# Patient Record
Sex: FEMALE | Race: WHITE | NOT HISPANIC OR LATINO
[De-identification: names, ages, dates, MRNs, and addresses within clinical notes are randomized per-mention and may not be internally consistent; named-entity substitution may affect disease eponyms.]

---

## 2017-09-07 ENCOUNTER — APPOINTMENT (OUTPATIENT)
Dept: ANTEPARTUM | Facility: CLINIC | Age: 32
End: 2017-09-07
Payer: COMMERCIAL

## 2017-09-07 ENCOUNTER — ASOB RESULT (OUTPATIENT)
Age: 32
End: 2017-09-07

## 2017-09-07 PROCEDURE — 76811 OB US DETAILED SNGL FETUS: CPT

## 2017-11-22 ENCOUNTER — OUTPATIENT (OUTPATIENT)
Dept: OUTPATIENT SERVICES | Facility: HOSPITAL | Age: 32
LOS: 1 days | End: 2017-11-22
Payer: COMMERCIAL

## 2017-11-22 DIAGNOSIS — Z3A.00 WEEKS OF GESTATION OF PREGNANCY NOT SPECIFIED: ICD-10-CM

## 2017-11-22 DIAGNOSIS — O26.899 OTHER SPECIFIED PREGNANCY RELATED CONDITIONS, UNSPECIFIED TRIMESTER: ICD-10-CM

## 2017-11-22 PROCEDURE — G0463: CPT

## 2017-11-22 PROCEDURE — 59025 FETAL NON-STRESS TEST: CPT

## 2017-12-12 ENCOUNTER — TRANSCRIPTION ENCOUNTER (OUTPATIENT)
Age: 32
End: 2017-12-12

## 2018-01-22 ENCOUNTER — INPATIENT (INPATIENT)
Facility: HOSPITAL | Age: 33
LOS: 1 days | Discharge: HOME | End: 2018-01-24
Attending: OBSTETRICS & GYNECOLOGY | Admitting: OBSTETRICS & GYNECOLOGY
Payer: COMMERCIAL

## 2018-01-22 VITALS
RESPIRATION RATE: 18 BRPM | SYSTOLIC BLOOD PRESSURE: 126 MMHG | OXYGEN SATURATION: 100 % | HEART RATE: 71 BPM | TEMPERATURE: 98 F | DIASTOLIC BLOOD PRESSURE: 79 MMHG

## 2018-01-22 DIAGNOSIS — O26.899 OTHER SPECIFIED PREGNANCY RELATED CONDITIONS, UNSPECIFIED TRIMESTER: ICD-10-CM

## 2018-01-22 DIAGNOSIS — Z3A.00 WEEKS OF GESTATION OF PREGNANCY NOT SPECIFIED: ICD-10-CM

## 2018-01-22 DIAGNOSIS — Z34.80 ENCOUNTER FOR SUPERVISION OF OTHER NORMAL PREGNANCY, UNSPECIFIED TRIMESTER: ICD-10-CM

## 2018-01-22 LAB
BASOPHILS # BLD AUTO: 0 K/UL — SIGNIFICANT CHANGE UP (ref 0–0.2)
BASOPHILS NFR BLD AUTO: 0.3 % — SIGNIFICANT CHANGE UP (ref 0–2)
BLD GP AB SCN SERPL QL: NEGATIVE — SIGNIFICANT CHANGE UP
EOSINOPHIL # BLD AUTO: 0.1 K/UL — SIGNIFICANT CHANGE UP (ref 0–0.5)
EOSINOPHIL NFR BLD AUTO: 0.9 % — SIGNIFICANT CHANGE UP (ref 0–6)
HCT VFR BLD CALC: 29.2 % — LOW (ref 34.5–45)
HGB BLD-MCNC: 10.5 G/DL — LOW (ref 11.5–15.5)
LYMPHOCYTES # BLD AUTO: 1.8 K/UL — SIGNIFICANT CHANGE UP (ref 1–3.3)
LYMPHOCYTES # BLD AUTO: 17.4 % — SIGNIFICANT CHANGE UP (ref 13–44)
MCHC RBC-ENTMCNC: 32.8 PG — SIGNIFICANT CHANGE UP (ref 27–34)
MCHC RBC-ENTMCNC: 36 GM/DL — SIGNIFICANT CHANGE UP (ref 32–36)
MCV RBC AUTO: 91 FL — SIGNIFICANT CHANGE UP (ref 80–100)
MONOCYTES # BLD AUTO: 0.5 K/UL — SIGNIFICANT CHANGE UP (ref 0–0.9)
MONOCYTES NFR BLD AUTO: 4.8 % — SIGNIFICANT CHANGE UP (ref 2–14)
NEUTROPHILS # BLD AUTO: 8.1 K/UL — HIGH (ref 1.8–7.4)
NEUTROPHILS NFR BLD AUTO: 76.7 % — SIGNIFICANT CHANGE UP (ref 43–77)
PLATELET # BLD AUTO: 201 K/UL — SIGNIFICANT CHANGE UP (ref 150–400)
RBC # BLD: 3.21 M/UL — LOW (ref 3.8–5.2)
RBC # FLD: 11.7 % — SIGNIFICANT CHANGE UP (ref 10.3–14.5)
RH IG SCN BLD-IMP: POSITIVE — SIGNIFICANT CHANGE UP
WBC # BLD: 10.5 K/UL — SIGNIFICANT CHANGE UP (ref 3.8–10.5)
WBC # FLD AUTO: 10.5 K/UL — SIGNIFICANT CHANGE UP (ref 3.8–10.5)

## 2018-01-22 RX ORDER — DIPHENHYDRAMINE HCL 50 MG
25 CAPSULE ORAL EVERY 6 HOURS
Qty: 0 | Refills: 0 | Status: DISCONTINUED | OUTPATIENT
Start: 2018-01-22 | End: 2018-01-24

## 2018-01-22 RX ORDER — IBUPROFEN 200 MG
600 TABLET ORAL EVERY 6 HOURS
Qty: 0 | Refills: 0 | Status: COMPLETED | OUTPATIENT
Start: 2018-01-22 | End: 2018-12-21

## 2018-01-22 RX ORDER — SODIUM CHLORIDE 9 MG/ML
1000 INJECTION, SOLUTION INTRAVENOUS ONCE
Qty: 0 | Refills: 0 | Status: DISCONTINUED | OUTPATIENT
Start: 2018-01-22 | End: 2018-01-22

## 2018-01-22 RX ORDER — DIBUCAINE 1 %
1 OINTMENT (GRAM) RECTAL EVERY 4 HOURS
Qty: 0 | Refills: 0 | Status: DISCONTINUED | OUTPATIENT
Start: 2018-01-22 | End: 2018-01-24

## 2018-01-22 RX ORDER — SODIUM CHLORIDE 9 MG/ML
3 INJECTION INTRAMUSCULAR; INTRAVENOUS; SUBCUTANEOUS EVERY 8 HOURS
Qty: 0 | Refills: 0 | Status: DISCONTINUED | OUTPATIENT
Start: 2018-01-22 | End: 2018-01-24

## 2018-01-22 RX ORDER — KETOROLAC TROMETHAMINE 30 MG/ML
30 SYRINGE (ML) INJECTION ONCE
Qty: 0 | Refills: 0 | Status: DISCONTINUED | OUTPATIENT
Start: 2018-01-22 | End: 2018-01-22

## 2018-01-22 RX ORDER — OXYCODONE HYDROCHLORIDE 5 MG/1
5 TABLET ORAL
Qty: 0 | Refills: 0 | Status: DISCONTINUED | OUTPATIENT
Start: 2018-01-22 | End: 2018-01-24

## 2018-01-22 RX ORDER — SODIUM CHLORIDE 9 MG/ML
1000 INJECTION, SOLUTION INTRAVENOUS
Qty: 0 | Refills: 0 | Status: DISCONTINUED | OUTPATIENT
Start: 2018-01-22 | End: 2018-01-22

## 2018-01-22 RX ORDER — ACETAMINOPHEN 500 MG
975 TABLET ORAL EVERY 6 HOURS
Qty: 0 | Refills: 0 | Status: COMPLETED | OUTPATIENT
Start: 2018-01-22 | End: 2018-12-21

## 2018-01-22 RX ORDER — OXYTOCIN 10 UNIT/ML
333.33 VIAL (ML) INJECTION
Qty: 20 | Refills: 0 | Status: COMPLETED | OUTPATIENT
Start: 2018-01-22 | End: 2018-01-22

## 2018-01-22 RX ORDER — PRAMOXINE HYDROCHLORIDE 150 MG/15G
1 AEROSOL, FOAM RECTAL EVERY 4 HOURS
Qty: 0 | Refills: 0 | Status: DISCONTINUED | OUTPATIENT
Start: 2018-01-22 | End: 2018-01-24

## 2018-01-22 RX ORDER — AER TRAVELER 0.5 G/1
1 SOLUTION RECTAL; TOPICAL EVERY 4 HOURS
Qty: 0 | Refills: 0 | Status: DISCONTINUED | OUTPATIENT
Start: 2018-01-22 | End: 2018-01-24

## 2018-01-22 RX ORDER — TETANUS TOXOID, REDUCED DIPHTHERIA TOXOID AND ACELLULAR PERTUSSIS VACCINE, ADSORBED 5; 2.5; 8; 8; 2.5 [IU]/.5ML; [IU]/.5ML; UG/.5ML; UG/.5ML; UG/.5ML
0.5 SUSPENSION INTRAMUSCULAR ONCE
Qty: 0 | Refills: 0 | Status: DISCONTINUED | OUTPATIENT
Start: 2018-01-22 | End: 2018-01-24

## 2018-01-22 RX ORDER — OXYTOCIN 10 UNIT/ML
333.33 VIAL (ML) INJECTION
Qty: 20 | Refills: 0 | Status: COMPLETED | OUTPATIENT
Start: 2018-01-22

## 2018-01-22 RX ORDER — SIMETHICONE 80 MG/1
80 TABLET, CHEWABLE ORAL EVERY 6 HOURS
Qty: 0 | Refills: 0 | Status: DISCONTINUED | OUTPATIENT
Start: 2018-01-22 | End: 2018-01-24

## 2018-01-22 RX ORDER — CITRIC ACID/SODIUM CITRATE 300-500 MG
15 SOLUTION, ORAL ORAL EVERY 4 HOURS
Qty: 0 | Refills: 0 | Status: DISCONTINUED | OUTPATIENT
Start: 2018-01-22 | End: 2018-01-22

## 2018-01-22 RX ORDER — OXYTOCIN 10 UNIT/ML
4 VIAL (ML) INJECTION
Qty: 30 | Refills: 0 | Status: DISCONTINUED | OUTPATIENT
Start: 2018-01-22 | End: 2018-01-24

## 2018-01-22 RX ORDER — IBUPROFEN 200 MG
600 TABLET ORAL EVERY 6 HOURS
Qty: 0 | Refills: 0 | Status: DISCONTINUED | OUTPATIENT
Start: 2018-01-22 | End: 2018-01-24

## 2018-01-22 RX ORDER — OXYTOCIN 10 UNIT/ML
41.67 VIAL (ML) INJECTION
Qty: 20 | Refills: 0 | Status: DISCONTINUED | OUTPATIENT
Start: 2018-01-22 | End: 2018-01-24

## 2018-01-22 RX ORDER — OXYCODONE HYDROCHLORIDE 5 MG/1
5 TABLET ORAL EVERY 4 HOURS
Qty: 0 | Refills: 0 | Status: DISCONTINUED | OUTPATIENT
Start: 2018-01-22 | End: 2018-01-24

## 2018-01-22 RX ORDER — INFLUENZA VIRUS VACCINE 15; 15; 15; 15 UG/.5ML; UG/.5ML; UG/.5ML; UG/.5ML
0.5 SUSPENSION INTRAMUSCULAR ONCE
Qty: 0 | Refills: 0 | Status: DISCONTINUED | OUTPATIENT
Start: 2018-01-22 | End: 2018-01-24

## 2018-01-22 RX ORDER — ACETAMINOPHEN 500 MG
975 TABLET ORAL EVERY 6 HOURS
Qty: 0 | Refills: 0 | Status: DISCONTINUED | OUTPATIENT
Start: 2018-01-22 | End: 2018-01-24

## 2018-01-22 RX ORDER — GLYCERIN ADULT
1 SUPPOSITORY, RECTAL RECTAL AT BEDTIME
Qty: 0 | Refills: 0 | Status: DISCONTINUED | OUTPATIENT
Start: 2018-01-22 | End: 2018-01-24

## 2018-01-22 RX ORDER — DOCUSATE SODIUM 100 MG
100 CAPSULE ORAL
Qty: 0 | Refills: 0 | Status: DISCONTINUED | OUTPATIENT
Start: 2018-01-22 | End: 2018-01-24

## 2018-01-22 RX ORDER — HYDROCORTISONE 1 %
1 OINTMENT (GRAM) TOPICAL EVERY 4 HOURS
Qty: 0 | Refills: 0 | Status: DISCONTINUED | OUTPATIENT
Start: 2018-01-22 | End: 2018-01-24

## 2018-01-22 RX ORDER — LANOLIN
1 OINTMENT (GRAM) TOPICAL EVERY 6 HOURS
Qty: 0 | Refills: 0 | Status: DISCONTINUED | OUTPATIENT
Start: 2018-01-22 | End: 2018-01-24

## 2018-01-22 RX ORDER — MAGNESIUM HYDROXIDE 400 MG/1
30 TABLET, CHEWABLE ORAL
Qty: 0 | Refills: 0 | Status: DISCONTINUED | OUTPATIENT
Start: 2018-01-22 | End: 2018-01-24

## 2018-01-22 RX ADMIN — Medication 1000 MILLIUNIT(S)/MIN: at 20:46

## 2018-01-22 RX ADMIN — OXYCODONE HYDROCHLORIDE 5 MILLIGRAM(S): 5 TABLET ORAL at 22:52

## 2018-01-22 RX ADMIN — OXYCODONE HYDROCHLORIDE 5 MILLIGRAM(S): 5 TABLET ORAL at 23:20

## 2018-01-22 RX ADMIN — Medication 4 MILLIUNIT(S)/MIN: at 14:58

## 2018-01-22 RX ADMIN — Medication 30 MILLIGRAM(S): at 16:48

## 2018-01-23 ENCOUNTER — TRANSCRIPTION ENCOUNTER (OUTPATIENT)
Age: 33
End: 2018-01-23

## 2018-01-23 LAB
HCT VFR BLD CALC: 29 % — LOW (ref 34.5–45)
HGB BLD-MCNC: 9.6 G/DL — LOW (ref 11.5–15.5)
T PALLIDUM AB TITR SER: NEGATIVE — SIGNIFICANT CHANGE UP

## 2018-01-23 RX ADMIN — Medication 600 MILLIGRAM(S): at 21:54

## 2018-01-23 RX ADMIN — OXYCODONE HYDROCHLORIDE 5 MILLIGRAM(S): 5 TABLET ORAL at 14:50

## 2018-01-23 RX ADMIN — OXYCODONE HYDROCHLORIDE 5 MILLIGRAM(S): 5 TABLET ORAL at 06:54

## 2018-01-23 RX ADMIN — Medication 600 MILLIGRAM(S): at 14:18

## 2018-01-23 RX ADMIN — Medication 975 MILLIGRAM(S): at 18:34

## 2018-01-23 RX ADMIN — Medication 100 MILLIGRAM(S): at 13:33

## 2018-01-23 RX ADMIN — OXYCODONE HYDROCHLORIDE 5 MILLIGRAM(S): 5 TABLET ORAL at 22:25

## 2018-01-23 RX ADMIN — Medication 100 MILLIGRAM(S): at 06:24

## 2018-01-23 RX ADMIN — OXYCODONE HYDROCHLORIDE 5 MILLIGRAM(S): 5 TABLET ORAL at 14:18

## 2018-01-23 RX ADMIN — OXYCODONE HYDROCHLORIDE 5 MILLIGRAM(S): 5 TABLET ORAL at 06:22

## 2018-01-23 RX ADMIN — OXYCODONE HYDROCHLORIDE 5 MILLIGRAM(S): 5 TABLET ORAL at 10:32

## 2018-01-23 RX ADMIN — Medication 975 MILLIGRAM(S): at 18:03

## 2018-01-23 RX ADMIN — Medication 975 MILLIGRAM(S): at 10:32

## 2018-01-23 RX ADMIN — Medication 600 MILLIGRAM(S): at 22:25

## 2018-01-23 RX ADMIN — OXYCODONE HYDROCHLORIDE 5 MILLIGRAM(S): 5 TABLET ORAL at 18:34

## 2018-01-23 RX ADMIN — Medication 600 MILLIGRAM(S): at 06:54

## 2018-01-23 RX ADMIN — Medication 600 MILLIGRAM(S): at 06:25

## 2018-01-23 RX ADMIN — Medication 975 MILLIGRAM(S): at 09:58

## 2018-01-23 RX ADMIN — Medication 1 TABLET(S): at 13:33

## 2018-01-23 RX ADMIN — OXYCODONE HYDROCHLORIDE 5 MILLIGRAM(S): 5 TABLET ORAL at 21:54

## 2018-01-23 RX ADMIN — OXYCODONE HYDROCHLORIDE 5 MILLIGRAM(S): 5 TABLET ORAL at 18:04

## 2018-01-23 RX ADMIN — OXYCODONE HYDROCHLORIDE 5 MILLIGRAM(S): 5 TABLET ORAL at 09:59

## 2018-01-23 RX ADMIN — Medication 600 MILLIGRAM(S): at 14:50

## 2018-01-23 NOTE — DISCHARGE NOTE OB - PATIENT PORTAL LINK FT
“You can access the [facility] Patient Portal, offered by [facility], by registering with the following website: [email or web address]”

## 2018-01-23 NOTE — DISCHARGE NOTE OB - CARE PROVIDER_API CALL
Amador Owen), Obstetrics and Gynecology  [address] [facility]  [address] [place] [address]  Phone: [phone]  Fax: [phone]

## 2018-01-23 NOTE — DISCHARGE NOTE OB - HOSPITAL COURSE
routine pp course  Patient felt increased anxiety before discharge  for psych consult before discharge

## 2018-01-23 NOTE — DISCHARGE NOTE OB - MATERIALS PROVIDED
Guide to Postpartum Care/Breastfeeding Log/Breastfeeding Mother’s Support Group Information/[facility] [place] Screening Program/Birth Certificate Instructions/[facility] Hearing Screen Program/Breastfeeding Guide and Packet

## 2018-01-23 NOTE — PROGRESS NOTE ADULT - PROBLEM SELECTOR PLAN 1
- Continue with po analgesia PRN  - Increase ambulation  - Continue regular diet  - IV lock  - AM H+H    Maru [email or web address]

## 2018-01-23 NOTE — DISCHARGE NOTE OB - CARE PLAN
Principal Discharge DX:	Vaginal delivery  Goal:	postpartum recovery  Assessment and plan of treatment:	regular diet instructions given   pp check 4-6 weeks

## 2018-01-23 NOTE — PROGRESS NOTE ADULT - ATTENDING COMMENTS
doing well  no issues. no f/c/n/v.   min lochia  vss afebrile  pain ok  ppd1 doing well.  dc home tomorrow.  virginia Jackson MD

## 2018-01-23 NOTE — PROGRESS NOTE ADULT - SUBJECTIVE AND OBJECTIVE BOX
Patient seen and examined at bedside, no acute overnight events. No acute complaints, pain well controlled. Patient is ambulating, voiding spontaneously, passing flatus, and tolerating regular diet.     Vital Signs Last 24 Hours  T(C): 36.6 (01-23-18 @ 05:00), Max: 36.8 (01-22-18 @ 16:45)  HR: 80 (01-23-18 @ 05:00) (63 - 80)  BP: 120/75 (01-23-18 @ 05:00) (120/75 - 140/67)  RR: 18 (01-23-18 @ 05:00) (18 - 18)  SpO2: 98% (01-22-18 @ 21:41) (98% - 100%)    Physical exam:  General: NAD  Abdomen: Soft, non-tender, non-distended, fundus firm  Pelvic: Lochia wnl    Labs:    Blood Type: O Positive  Antibody Screen: Negative  RPR: Negative               10.5   10.5  )-----------( 201      ( 01-22 @ 13:03 )             29.2         MEDICATIONS  (STANDING):  acetaminophen   Tablet. 975 milliGRAM(s) Oral every 6 hours  diphtheria/tetanus/pertussis (acellular) Vaccine (ADAcel) 0.5 milliLiter(s) IntraMuscular once  ibuprofen  Tablet 600 milliGRAM(s) Oral every 6 hours  influenza   Vaccine 0.5 milliLiter(s) IntraMuscular once  oxyCODONE    IR 5 milliGRAM(s) Oral every 3 hours  oxytocin Infusion 41.667 milliUNIT(s)/Min (125 mL/Hr) IV Continuous <Continuous>  oxytocin Infusion 4 milliUNIT(s)/Min (4 mL/Hr) IV Continuous <Continuous>  oxytocin Infusion 41.667 milliUNIT(s)/Min (125 mL/Hr) IV Continuous <Continuous>  prenatal multivitamin 1 Tablet(s) Oral daily  sodium chloride 0.9% lock flush 3 milliLiter(s) IV Push every 8 hours  sodium chloride 0.9% lock flush 3 milliLiter(s) IV Push every 8 hours    MEDICATIONS  (PRN):  dibucaine 1% Ointment 1 Application(s) Topical every 4 hours PRN Perineal Discomfort  dibucaine 1% Ointment 1 Application(s) Topical every 4 hours PRN Tenderness of the episiotomy site  diphenhydrAMINE   Capsule 25 milliGRAM(s) Oral every 6 hours PRN Itching  docusate sodium 100 milliGRAM(s) Oral two times a day PRN Stool Softening  glycerin Suppository - Adult 1 Suppository(s) Rectal at bedtime PRN Constipation  hydrocortisone 1% Cream 1 Application(s) Topical every 4 hours PRN Moderate to Severe Perineal Pain  hydrocortisone 1% Cream 1 Application(s) Topical every 4 hours PRN Moderate to Severe Perineal Pain  lanolin Ointment 1 Application(s) Topical every 6 hours PRN Sore Nipples  magnesium hydroxide Suspension 30 milliLiter(s) Oral two times a day PRN Constipation  oxyCODONE    IR 5 milliGRAM(s) Oral every 4 hours PRN Severe Pain (7 -10)  pramoxine 1%/zinc 5% Cream 1 Application(s) Topical every 4 hours PRN Moderate to Severe Perineal Pain  pramoxine 1%/zinc 5% Cream 1 Application(s) Topical every 4 hours PRN Moderate to Severe Perineal Pain  simethicone 80 milliGRAM(s) Chew every 6 hours PRN Gas  witch hazel Pads 1 Application(s) Topical every 4 hours PRN Perineal discomfort  witch hazel Pads 1 Application(s) Topical every 4 hours PRN Perineal Discomfort

## 2018-01-23 NOTE — DISCHARGE NOTE OB - MEDICATION SUMMARY - MEDICATIONS TO TAKE
I will START or STAY ON the medications listed below when I get home from the hospital:    ibuprofen 600 mg oral tablet  -- 1 tab(s) by mouth every 6 hours  -- Indication: For moderate pain    Prenatal Multivitamins with Folic Acid 1 mg oral tablet  -- 1 tab(s) by mouth once a day  -- Indication: For postpartum

## 2018-01-24 VITALS
SYSTOLIC BLOOD PRESSURE: 124 MMHG | RESPIRATION RATE: 18 BRPM | TEMPERATURE: 98 F | DIASTOLIC BLOOD PRESSURE: 82 MMHG | HEART RATE: 57 BPM

## 2018-01-24 DIAGNOSIS — F41.9 ANXIETY DISORDER, UNSPECIFIED: ICD-10-CM

## 2018-01-24 PROCEDURE — 85027 COMPLETE CBC AUTOMATED: CPT

## 2018-01-24 PROCEDURE — 85018 HEMOGLOBIN: CPT

## 2018-01-24 PROCEDURE — G0463: CPT

## 2018-01-24 PROCEDURE — 86900 BLOOD TYPING SEROLOGIC ABO: CPT

## 2018-01-24 PROCEDURE — 86850 RBC ANTIBODY SCREEN: CPT

## 2018-01-24 PROCEDURE — 86780 TREPONEMA PALLIDUM: CPT

## 2018-01-24 PROCEDURE — 99223 1ST HOSP IP/OBS HIGH 75: CPT | Mod: GC

## 2018-01-24 PROCEDURE — 59025 FETAL NON-STRESS TEST: CPT

## 2018-01-24 PROCEDURE — 59050 FETAL MONITOR W/REPORT: CPT

## 2018-01-24 PROCEDURE — 86901 BLOOD TYPING SEROLOGIC RH(D): CPT

## 2018-01-24 RX ADMIN — Medication 600 MILLIGRAM(S): at 05:54

## 2018-01-24 RX ADMIN — Medication 1 TABLET(S): at 11:47

## 2018-01-24 RX ADMIN — Medication 100 MILLIGRAM(S): at 11:49

## 2018-01-24 RX ADMIN — Medication 600 MILLIGRAM(S): at 11:48

## 2018-01-24 RX ADMIN — Medication 600 MILLIGRAM(S): at 12:18

## 2018-01-24 RX ADMIN — Medication 100 MILLIGRAM(S): at 05:54

## 2018-01-24 RX ADMIN — Medication 600 MILLIGRAM(S): at 06:25

## 2018-01-24 NOTE — BEHAVIORAL HEALTH ASSESSMENT NOTE - NSBHCHARTREVIEWVS_PSY_A_CORE FT
Vital Signs Last 24 Hrs  T(C): 36.6 (24 Jan 2018 05:00), Max: 36.7 (23 Jan 2018 17:05)  T(F): 97.9 (24 Jan 2018 05:00), Max: 98.1 (23 Jan 2018 17:05)  HR: 57 (24 Jan 2018 05:00) (57 - 71)  BP: 124/82 (24 Jan 2018 05:00) (124/82 - 130/78)  RR: 18 (24 Jan 2018 05:00) (18 - 18)

## 2018-01-24 NOTE — BEHAVIORAL HEALTH ASSESSMENT NOTE - HPI (INCLUDE ILLNESS QUALITY, SEVERITY, DURATION, TIMING, CONTEXT, MODIFYING FACTORS, ASSOCIATED SIGNS AND SYMPTOMS)
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 Psychiatry was consulted for management of anxiety. Patient endorses anxiety that abruptly started today, which she described as anxious feeling, uncontrolled crying, and restlessness. Patient was able to get some of her emotions under control. During her last pregnancy, patient had similar symptoms for about a year, on and off. She did not see anybody about these issues. She felt that the anxiety made her feel restless and at times overwhelmed, but did not impact her functioning in her life. She states that if anything, it made it hard for her to rest, as she wanted to feel in control of all aspects of her life and mothering. 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 Psychiatry was consulted for management of anxiety. Patient endorses anxiety that abruptly started today, which she described as anxious feeling, uncontrolled crying, and restlessness. Patient was able to get some of her emotions under control. During her last pregnancy, patient had similar symptoms for about a year, which began immediately after birth, and decreased from daily to weekly over the course of a year. She did not see anybody about these issues. She felt that the anxiety made her feel restless and at times overwhelmed, but did not impact her functioning in her life. She states that if anything, it made it hard for her to rest, as she wanted to feel in control of all aspects of her life and mothering. Patient also endorses some mood swings during periods, and when she was on OCPs, which she attributes to hormonal issues. Patient has no history of psychiatric treatment or substance use. Patient denies depressive and manic symptoms. Denies SI, HI, AVH.     Patient's  is at bedside, and confirms the above. He states that even through the year of anxiety she experienced after her last pregnancy, she never expressed depression, or had times when she was unable to get out of bed.

## 2018-01-24 NOTE — BEHAVIORAL HEALTH ASSESSMENT NOTE - NSBHCHARTREVIEWLAB_PSY_A_CORE FT
Labs reviewed personally [X]                        9.6    x     )-----------( x        ( 23 Jan 2018 07:40 )             29.0     Hgb Trend: 9.6<--, 10.5<--

## 2018-01-24 NOTE — BEHAVIORAL HEALTH ASSESSMENT NOTE - NSBHCONSULTRECOMMENDOTHER_PSY_A_CORE FT
Patient was given referrals to outpatient psychiatrists at [facility] to follow up for refills.

## 2018-01-24 NOTE — BEHAVIORAL HEALTH ASSESSMENT NOTE - NSBHREFERDETAILS_PSY_A_CORE_FT
Postpartum day 2, found to be crying this morning, very anxious, advise for Rx for patient to be discharged on

## 2018-01-24 NOTE — BEHAVIORAL HEALTH ASSESSMENT NOTE - CASE SUMMARY
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  On exam, pt states she had intermittent feelings of anxiety for 1 year following the birth of her first child.  Episodes of anxiety can last minutes to hours, characterized by crying spells and feeling overwhelmed, no identifiable triggers or associated physical symptoms.  Pt states she has historically been sensitive to hormonal changes; also had mood sx in the setting of OCP use.  This morning, pt felt anxious and had a crying spell; felt worried she would have ongoing anxiety again with this child, so she requested a PRN medication and outpatient psychiatric referral.  Pt denies depressive, manic, or psychotic sx, denies current/past SIIP/HIIP or substance abuse.  States this pregnancy was uneventful, plans to breastfeed for a few months.  States she has ample social supports, as well as supportive parents and in-laws.   at bedside, denies acute safety concerns or concerns for suicidality or homicidality.  States that in spite of feelings of increased anxiety, pt has been able to remain active in her work and personal life, as well as childcare responsibilities.  Pt appears well-bonded to baby, was seen nursing and kissing baby.  Dx:  Anxiety Disorder unspecified.  Recs: 1. Declining standing psych meds for now, can f/u as outpatient for possible SSRI.  2. PRN: Ativan 0.5mg PO tid PRN anxiety.  3. OP psych f/u at [facility]  clinic- [phone].  Does not meet criteria for psychiatric admission.

## 2018-01-24 NOTE — BEHAVIORAL HEALTH ASSESSMENT NOTE - SUMMARY
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 Psychiatry was consulted for management of anxiety. Patient endorses anxiety that abruptly started today, which she described as anxious feeling, uncontrolled crying, and restlessness. She states that the anxiety she experienced today is similar to the anxiety she felt after her last pregnancy. Patient would not like to take medication daily, but is open to taking medication for anxiety as needed. She is also interested in continuing psychiatric care as an outpatient.

## 2020-10-06 NOTE — BEHAVIORAL HEALTH ASSESSMENT NOTE - GROOMING
[FreeTextEntry1] : Documented by Winsome Oakes acting as a scribe for Dr. Pato Knight on (10/06/2020).\par \par All medical record entries made by the Scribe were at my, Dr. Pato Knight's, direction and personally dictated by me on (10/06/2020). I have reviewed the chart and agree that the record accurately reflects my personal performance of the history, physical exam, assessment and plan. I have also personally directed, reviewed, and agree with the discharge instructions. \par \par \par   Good

## 2021-02-17 NOTE — DISCHARGE NOTE OB - DRINK 8 TO 10 GLASSES OF FLUID EACH DAY
Statement Selected Topical Sulfur Applications Counseling: Topical Sulfur Counseling: Patient counseled that this medication may cause skin irritation or allergic reactions.  In the event of skin irritation, the patient was advised to reduce the amount of the drug applied or use it less frequently.   The patient verbalized understanding of the proper use and possible adverse effects of topical sulfur application.  All of the patient's questions and concerns were addressed.

## 2021-08-05 NOTE — DISCHARGE NOTE OB - PROVIDER RX CONTACT NUMBER
Lichenoid keratosis lesion on the right anterior shoulder will continue to monitor changes noted way consider biopsy  Seborrheic keratosis patient reassured these are normal growths we acquire with age no treatment needed  History of skin cancer in no recurrence nothing else atypical sunblock recommended follow-up in 6 months  Screening for dermatologic disorders nothing else of concern noted on complete exam follow-up in 6 months    [phone]

## 2025-09-18 ENCOUNTER — APPOINTMENT (OUTPATIENT)
Dept: OTHER | Facility: CLINIC | Age: 40
End: 2025-09-18
Payer: COMMERCIAL

## 2025-09-18 PROCEDURE — 99202 OFFICE O/P NEW SF 15 MIN: CPT | Mod: 95
